# Patient Record
Sex: FEMALE
[De-identification: names, ages, dates, MRNs, and addresses within clinical notes are randomized per-mention and may not be internally consistent; named-entity substitution may affect disease eponyms.]

---

## 2023-02-15 ENCOUNTER — RX ONLY (OUTPATIENT)
Age: 53
Setting detail: RX ONLY
End: 2023-02-15

## 2023-11-15 ENCOUNTER — APPOINTMENT (RX ONLY)
Dept: URBAN - METROPOLITAN AREA CLINIC 55 | Facility: CLINIC | Age: 53
Setting detail: DERMATOLOGY
End: 2023-11-15

## 2023-11-15 ENCOUNTER — RX ONLY (OUTPATIENT)
Age: 53
Setting detail: RX ONLY
End: 2023-11-15

## 2023-11-15 DIAGNOSIS — Z41.9 ENCOUNTER FOR PROCEDURE FOR PURPOSES OTHER THAN REMEDYING HEALTH STATE, UNSPECIFIED: ICD-10-CM

## 2023-11-15 PROCEDURE — ? IN-HOUSE DISPENSING PHARMACY

## 2023-11-15 PROCEDURE — ? COSMETIC CONSULTATION: LASER RESURFACING

## 2023-11-15 PROCEDURE — ? INVENTORY

## 2023-11-15 RX ORDER — VALACYCLOVIR 500 MG/1
TABLET, FILM COATED ORAL
Qty: 10 | Refills: 0 | Status: ERX | COMMUNITY
Start: 2023-11-15

## 2023-11-15 NOTE — PROCEDURE: IN-HOUSE DISPENSING PHARMACY
Product 2 Amount/Unit (Numbers Only): 30
Product 52 Amount/Unit (Numbers Only): 0
Product 4 Units Dispensed: 1
Product 4 Refills: 2
Product 5 Refills: 11
Render Refills If Set To 0: Yes
Product 34 Unit Type: mg
Product 5 Unit Type: ml
Product 2 Application Directions: Apply nightly or as directed.
Product 6 Application Directions: Apply nightly or as directed. Use SPF daily.
Name Of Product 7: Lidocaine 23% / Tetracaine 7% Cream
Name Of Product 6: Rosacea Triple Combination Gel
Product 7 Application Directions: Apply only as directed
Name Of Product 5: Hydrating Tretinoin 0.025% Cream
Name Of Product 3: Acne Triple Combination Gel
Name Of Product 1: Anti-Aging Brightening Cream
Detail Level: Zone
Name Of Product 4: Hydroquinone 8% Emulsion
Send Charges To Patient Encounter: No
Name Of Product 2: Dapsone / Spironolactone Gel

## 2024-03-15 ENCOUNTER — APPOINTMENT (RX ONLY)
Dept: URBAN - METROPOLITAN AREA CLINIC 55 | Facility: CLINIC | Age: 54
Setting detail: DERMATOLOGY
End: 2024-03-15

## 2024-03-15 DIAGNOSIS — Z41.9 ENCOUNTER FOR PROCEDURE FOR PURPOSES OTHER THAN REMEDYING HEALTH STATE, UNSPECIFIED: ICD-10-CM

## 2024-03-15 PROCEDURE — ? INVENTORY

## 2024-03-15 PROCEDURE — ? CLEAR AND BRILLIANT LASER

## 2024-03-15 ASSESSMENT — LOCATION DETAILED DESCRIPTION DERM: LOCATION DETAILED: LEFT INFERIOR CENTRAL MALAR CHEEK

## 2024-03-15 ASSESSMENT — LOCATION ZONE DERM: LOCATION ZONE: FACE

## 2024-03-15 ASSESSMENT — LOCATION SIMPLE DESCRIPTION DERM: LOCATION SIMPLE: LEFT CHEEK

## 2024-03-15 NOTE — PROCEDURE: CLEAR AND BRILLIANT LASER
Length Of Topical Anesthesia Application (Optional): 60 minutes
Laser Type: Clear+Brilliant
Energy Level: Low
Passes (Will Not Render If 0): 8
Post-Care Instructions: I reviewed with the patient in detail post-care instructions. Patient should stay away from the sun and wear sun protection until treated areas are fully healed.
Topical Anesthesia?: BLT cream (benzocaine 20%, lidocaine 6%, tetracaine 4%)
Pre-Procedure Text: The skin was cleansed with a gentle cleanser and prepped with 70% isopropyl alcohol.
Detail Level: Zone
Post-Procedure Care: Ice packs were given to patient. Alastin nectar and hydratint was applied post procedure.
Total Pulses: full face
Consent: Written consent obtained, risks reviewed including but not limited to crusting, scabbing, blistering, scarring, darker or lighter pigmentary change, incidental hair removal, bruising, and/or incomplete removal.
Energy Level: High

## 2024-11-18 ENCOUNTER — APPOINTMENT (RX ONLY)
Dept: URBAN - METROPOLITAN AREA CLINIC 55 | Facility: CLINIC | Age: 54
Setting detail: DERMATOLOGY
End: 2024-11-18

## 2024-11-18 DIAGNOSIS — Z41.9 ENCOUNTER FOR PROCEDURE FOR PURPOSES OTHER THAN REMEDYING HEALTH STATE, UNSPECIFIED: ICD-10-CM

## 2024-11-18 PROCEDURE — ? COSMETIC CONSULTATION: BOTULINUM TOXIN

## 2024-11-18 PROCEDURE — ? INVENTORY

## 2024-11-18 PROCEDURE — ? DAXXIFY

## 2024-11-18 NOTE — PROCEDURE: DAXXIFY
Masseter Units: 0
Show Nasal Units: Yes
Comments: Make up was removed from treatment area and area was prepper with 70% isopropyl alcohol.
Periorbital Skin Units: 24
Consent: Written consent and verbal obtained. Risk reviewed.
Show Right And Left Periorbital Units: No
Post-Care Instructions: Patient instructed to not lie down for 4 hours and limit physical activity for 24 hours.
Dilution (U/0.1 Cc): 8
Bill Summary Price Listed Below, Or Bill Total Of Units X Price Per Unit?: Bill Summary Price Below
Glabellar Complex Units: 40
Detail Level: Detailed
Additional Area 1 Location: upper cutaneous lip
Show Inventory Tab: Show
Additional Area 2 Location: lower lip

## 2024-12-04 ENCOUNTER — APPOINTMENT (OUTPATIENT)
Dept: URBAN - METROPOLITAN AREA CLINIC 55 | Facility: CLINIC | Age: 54
Setting detail: DERMATOLOGY
End: 2024-12-04

## 2024-12-04 ENCOUNTER — RX ONLY (RX ONLY)
Age: 54
End: 2024-12-04

## 2024-12-04 DIAGNOSIS — Z41.9 ENCOUNTER FOR PROCEDURE FOR PURPOSES OTHER THAN REMEDYING HEALTH STATE, UNSPECIFIED: ICD-10-CM

## 2024-12-04 PROCEDURE — ? DAXXIFY

## 2024-12-04 PROCEDURE — ? COSMETIC CONSULTATION: MICRONEEDLING

## 2024-12-04 PROCEDURE — ? INVENTORY

## 2024-12-04 NOTE — PROCEDURE: DAXXIFY
Depressor Anguli Oris Units: 0
Show Inventory Tab: Show
Show Topical Anesthesia: Yes
Additional Area 2 Location: lower lip
Bill Summary Price Listed Below, Or Bill Total Of Units X Price Per Unit?: Bill Summary Price Below
Periorbital Skin Units: 4
Show Ucl Units: No
Dilution (U/0.1 Cc): 8
Detail Level: Detailed
Comments: Make up was removed from treatment area and area was prepper with 70% isopropyl alcohol.
Consent: Written consent and verbal obtained. Risk reviewed.
Additional Area 1 Location: upper cutaneous lip
Post-Care Instructions: Patient instructed to not lie down for 4 hours and limit physical activity for 24 hours.

## 2025-01-03 ENCOUNTER — APPOINTMENT (OUTPATIENT)
Dept: URBAN - METROPOLITAN AREA CLINIC 55 | Facility: CLINIC | Age: 55
Setting detail: DERMATOLOGY
End: 2025-01-03

## 2025-01-03 DIAGNOSIS — Z41.9 ENCOUNTER FOR PROCEDURE FOR PURPOSES OTHER THAN REMEDYING HEALTH STATE, UNSPECIFIED: ICD-10-CM

## 2025-01-03 PROCEDURE — ? INVENTORY

## 2025-01-03 PROCEDURE — ? MICRONEEDLING

## 2025-01-03 NOTE — PROCEDURE: MICRONEEDLING
Depth In Mm (Location #5): 0.1
Length Of Topical Anesthesia Application (Optional): 60 minutes
Speed (Location #1): pattern: horizontal,vertical,diagonal
Detail Level: Zone
Treatment Number (Optional): 1
Consent obtained and risk reviewed.
Location #1: full face
Topical Anesthesia?: BLT gel (benzocaine 20%, lidocaine 6%, tetracaine 4%)
How Many Cc Of Bacteriostatic 0.9% Saline Were Added?: 0
Post-Care Instructions: After the procedure, post care products and physical sunscreen applied to the treatment area. \\nRecommended to not apply make-up after the procedure and avoid strenuous exercise for 12 hours. All handout information was given verbally and written.